# Patient Record
Sex: MALE | Race: WHITE | NOT HISPANIC OR LATINO | URBAN - METROPOLITAN AREA
[De-identification: names, ages, dates, MRNs, and addresses within clinical notes are randomized per-mention and may not be internally consistent; named-entity substitution may affect disease eponyms.]

---

## 2022-06-29 ENCOUNTER — HOSPITAL ENCOUNTER (OUTPATIENT)
Dept: CT IMAGING | Facility: HOSPITAL | Age: 67
Discharge: HOME | End: 2022-06-29
Payer: MEDICARE

## 2022-06-29 DIAGNOSIS — S06.6X0A TRAUMATIC SUBARACHNOID HEMORRHAGE WITHOUT LOSS OF CONSCIOUSNESS, INITIAL ENCOUNTER (HCC): ICD-10-CM

## 2022-06-29 PROCEDURE — G1004 CDSM NDSC: HCPCS

## 2022-06-29 PROCEDURE — 70450 CT HEAD/BRAIN W/O DYE: CPT

## 2022-07-28 ENCOUNTER — OFFICE VISIT (OUTPATIENT)
Dept: URGENT CARE | Facility: CLINIC | Age: 67
End: 2022-07-28
Payer: MEDICARE

## 2022-07-28 VITALS — RESPIRATION RATE: 18 BRPM | HEART RATE: 61 BPM | OXYGEN SATURATION: 99 % | TEMPERATURE: 97.7 F

## 2022-07-28 DIAGNOSIS — H61.21 IMPACTED CERUMEN OF RIGHT EAR: Primary | ICD-10-CM

## 2022-07-28 PROCEDURE — 69210 REMOVE IMPACTED EAR WAX UNI: CPT

## 2022-07-28 PROCEDURE — 99213 OFFICE O/P EST LOW 20 MIN: CPT

## 2022-07-28 PROCEDURE — G0463 HOSPITAL OUTPT CLINIC VISIT: HCPCS

## 2022-07-28 RX ORDER — RIVAROXABAN 10 MG/1
10 TABLET, FILM COATED ORAL EVERY MORNING
COMMUNITY
Start: 2022-07-08

## 2022-07-28 RX ORDER — OFLOXACIN 3 MG/ML
10 SOLUTION AURICULAR (OTIC) DAILY
Qty: 5 ML | Refills: 0 | Status: SHIPPED | OUTPATIENT
Start: 2022-07-28 | End: 2022-08-04

## 2022-07-28 NOTE — PROGRESS NOTES
[facility]        NAME: Maxine Dia is a 74 y o  male  : 1955    MRN: [id number]  DATE: 2022  TIME: 1:01 PM    Assessment and Plan   Impacted cerumen of right ear [H61 21]  1  Impacted cerumen of right ear  ofloxacin (FLOXIN) 0 3 % otic solution     Successful removal of large amounts of ear wax  Tolerated well  Ear canal red with small abrasion  Will cover with ofloxacin ear drops  Patient Instructions     Apply ear drops  Follow up with PCP in 3-5 days  Consider seeing either PCP or ENT every 6 months for ear flushing  Proceed to the ER with worsening symptoms  Chief Complaint     Chief Complaint   Patient presents with    Earache     Greater than a week  States feels a bump in ear  Denies difficulty hearing,  pain to R ear         History of Present Illness       The patient presents today with complaints of ear discomfort, and a bump x 1 week  He denies recent illness, fevers, chills, or decreased hearing  He has a history of ear wax build up  He has not tried anything OTC for his symptoms  Review of Systems   Review of Systems   Constitutional: Negative for chills, fatigue and fever  HENT: Positive for ear pain (r ear discomfort)  Negative for congestion, ear discharge, postnasal drip, rhinorrhea, sinus pressure, sinus pain, sneezing, sore throat and trouble swallowing  Eyes: Negative  Respiratory: Negative for cough and shortness of breath  Cardiovascular: Negative for chest pain and palpitations  Gastrointestinal: Negative for abdominal pain, diarrhea, nausea and vomiting  Genitourinary: Negative for difficulty urinating  Musculoskeletal: Negative for myalgias  Skin: Negative for rash  Allergic/Immunologic: Negative for environmental allergies  Neurological: Negative for dizziness and headaches  Psychiatric/Behavioral: Negative            Current Medications       Current Outpatient Medications:     ofloxacin (FLOXIN) 0 3 % otic solution, Administer 10 drops to the right ear daily for 7 days, Disp: 5 mL, Rfl: 0    Xarelto 10 MG tablet, Take 10 mg by mouth every morning, Disp: , Rfl:     Current Allergies     Allergies as of 07/28/2022    (No Known Allergies)            The following portions of the patient's history were reviewed and updated as appropriate: allergies, current medications, past family history, past medical history, past social history, past surgical history and problem list      History reviewed  No pertinent past medical history  History reviewed  No pertinent surgical history  History reviewed  No pertinent family history  Medications have been verified  Objective   Pulse 61   Temp 97 7 °F (36 5 °C)   Resp 18   SpO2 99%        Physical Exam     Physical Exam  Vitals and nursing note reviewed  Constitutional:       General: He is not in acute distress  Appearance: Normal appearance  He is not ill-appearing  HENT:      Head: Normocephalic and atraumatic  Right Ear: External ear normal  Decreased hearing noted  Tenderness present  No laceration, drainage or swelling  There is impacted cerumen  No mastoid tenderness  Tympanic membrane is not perforated, erythematous or bulging  Left Ear: Hearing, tympanic membrane, ear canal and external ear normal       Ears:      Comments: After cerumen was removed, able to visualize ear canal and TM  Ear canal noted to be red, inflamed with small abrasion related to large amount of wax removed  No active bleeding noted  TM was intact  Nose: Nose normal       Mouth/Throat:      Lips: Pink  Mouth: Mucous membranes are moist       Pharynx: Oropharynx is clear  Tonsils: No tonsillar exudate  Eyes:      General: Vision grossly intact  Extraocular Movements: Extraocular movements intact  Pupils: Pupils are equal, round, and reactive to light  Cardiovascular:      Rate and Rhythm: Normal rate and regular rhythm        Heart sounds: Normal heart sounds  No murmur heard  Pulmonary:      Effort: Pulmonary effort is normal       Breath sounds: Normal breath sounds  Abdominal:      General: Abdomen is flat  Bowel sounds are normal       Palpations: Abdomen is soft  Musculoskeletal:         General: Normal range of motion  Cervical back: Normal range of motion  Skin:     General: Skin is warm  Findings: No rash  Neurological:      Mental Status: He is alert and oriented to person, place, and time  Motor: Motor function is intact  Psychiatric:         Attention and Perception: Attention normal          Mood and Affect: Mood normal                Ear cerumen removal    Date/Time: 7/28/2022 12:55 PM  Performed by: SHELLEY Carty  Authorized by: SHELLEY Carty   Universal Protocol:  Procedure performed by: Stanton Glover, [facility])  Consent: Verbal consent obtained  Consent given by: patient  Patient identity confirmed: verbally with patient      Patient location:  Clinic  Procedure details:     Location:  R ear    Procedure type: irrigation with instrumentation      Instrumentation: curette and forceps      Approach:  External  Post-procedure details:     Complication:  None    Hearing quality:  Improved    Patient tolerance of procedure: Tolerated well, no immediate complications  Comments:      Debrox applied to R ear and had patient lay with affected side up for 10 minutes  This was repeated three times each with irrigation and removal of ear wax in between

## 2022-07-28 NOTE — PATIENT INSTRUCTIONS
Apply ear drops  Follow up with PCP in 3-5 days  Consider seeing either PCP or ENT every 6 months for ear flushing  Proceed to the ER with worsening symptoms  Earwax Blockage   AMBULATORY CARE:   Earwax  can build up in your ear canal and cause a blockage  Earwax blockage happens when your ear makes earwax faster than your body can remove it  Common symptoms include the following:   Trouble hearing    Earache    Ear fullness or a feeling that something is plugging up your ear    Itching or ringing in your ear    Dizziness    Seed immediate care if:   You feel dizzy  You have discharge or blood coming out of your ear  Your ear pain does not go away or gets worse  Call your doctor if:   You have a fever  You have trouble hearing or hear ringing noises  You have questions or concerns about your condition or care  Treatment for earwax blockage:   Medicines  placed in the ear canal can soften the earwax so it will come out  Flushing your ear canal  with warm water may flush out the earwax  Small medical tools  may be used to remove the earwax  How to prevent earwax blockage:  Do not stick anything into your ears to clean them  Use cotton swabs on the outside of your ear only  Ask your healthcare provider for more information on ways to prevent blockage  Follow up with your doctor as directed:  Write down your questions so you remember to ask them during your visits  © Copyright [employer] 2022 Information is for End User's use only and may not be sold, redistributed or otherwise used for commercial purposes  All illustrations and images included in CareNotes® are the copyrighted property of A D A M , Inc  or [facility] Derrick Anthony   The above information is an  only  It is not intended as medical advice for individual conditions or treatments   Talk to your doctor, nurse or pharmacist before following any medical regimen to see if it is safe and effective for you

## 2022-11-28 ENCOUNTER — APPOINTMENT (EMERGENCY)
Dept: RADIOLOGY | Facility: HOSPITAL | Age: 67
End: 2022-11-28

## 2022-11-28 ENCOUNTER — HOSPITAL ENCOUNTER (EMERGENCY)
Facility: HOSPITAL | Age: 67
Discharge: HOME | End: 2022-11-28
Attending: EMERGENCY MEDICINE

## 2022-11-28 ENCOUNTER — APPOINTMENT (EMERGENCY)
Dept: VASCULAR ULTRASOUND | Facility: HOSPITAL | Age: 67
End: 2022-11-28

## 2022-11-28 VITALS
HEIGHT: 70 IN | OXYGEN SATURATION: 98 % | BODY MASS INDEX: 26.34 KG/M2 | HEART RATE: 102 BPM | WEIGHT: 184 LBS | DIASTOLIC BLOOD PRESSURE: 86 MMHG | RESPIRATION RATE: 16 BRPM | TEMPERATURE: 98.1 F | SYSTOLIC BLOOD PRESSURE: 130 MMHG

## 2022-11-28 DIAGNOSIS — G56.21 CUBITAL TUNNEL SYNDROME ON RIGHT: ICD-10-CM

## 2022-11-28 DIAGNOSIS — M79.601 RIGHT ARM PAIN: Primary | ICD-10-CM

## 2022-11-28 RX ORDER — NAPROXEN 250 MG/1
500 TABLET ORAL ONCE
Status: CANCELLED | OUTPATIENT
Start: 2022-11-28 | End: 2022-11-28

## 2022-11-28 RX ORDER — NAPROXEN 500 MG/1
500 TABLET ORAL 2 TIMES DAILY WITH MEALS
Qty: 20 TABLET | Refills: 0 | Status: SHIPPED | OUTPATIENT
Start: 2022-11-28
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
## 2025-01-16 ENCOUNTER — APPOINTMENT (OUTPATIENT)
Dept: RADIOLOGY | Facility: CLINIC | Age: 70
End: 2025-01-16
Payer: MEDICARE

## 2025-01-16 ENCOUNTER — OFFICE VISIT (OUTPATIENT)
Dept: URGENT CARE | Facility: CLINIC | Age: 70
End: 2025-01-16
Payer: MEDICARE

## 2025-01-16 VITALS
OXYGEN SATURATION: 97 % | HEART RATE: 78 BPM | DIASTOLIC BLOOD PRESSURE: 93 MMHG | TEMPERATURE: 98 F | RESPIRATION RATE: 16 BRPM | SYSTOLIC BLOOD PRESSURE: 153 MMHG

## 2025-01-16 DIAGNOSIS — M53.3 SACROILIAC JOINT PAIN: Primary | ICD-10-CM

## 2025-01-16 DIAGNOSIS — M53.3 SACROILIAC JOINT PAIN: ICD-10-CM

## 2025-01-16 PROCEDURE — 73502 X-RAY EXAM HIP UNI 2-3 VIEWS: CPT

## 2025-01-16 PROCEDURE — 99213 OFFICE O/P EST LOW 20 MIN: CPT | Performed by: PHYSICIAN ASSISTANT

## 2025-01-16 PROCEDURE — 96372 THER/PROPH/DIAG INJ SC/IM: CPT | Performed by: PHYSICIAN ASSISTANT

## 2025-01-16 PROCEDURE — G0463 HOSPITAL OUTPT CLINIC VISIT: HCPCS | Performed by: PHYSICIAN ASSISTANT

## 2025-01-16 RX ORDER — IBUPROFEN 400 MG/1
TABLET, FILM COATED ORAL EVERY 6 HOURS PRN
COMMUNITY
End: 2025-01-16

## 2025-01-16 RX ORDER — KETOROLAC TROMETHAMINE 30 MG/ML
30 INJECTION, SOLUTION INTRAMUSCULAR; INTRAVENOUS ONCE
Status: COMPLETED | OUTPATIENT
Start: 2025-01-16 | End: 2025-01-16

## 2025-01-16 RX ORDER — PREDNISONE 10 MG/1
TABLET ORAL
Qty: 30 TABLET | Refills: 0 | Status: SHIPPED | OUTPATIENT
Start: 2025-01-16

## 2025-01-16 RX ORDER — LORATADINE 10 MG/1
10 TABLET ORAL
COMMUNITY

## 2025-01-16 RX ADMIN — KETOROLAC TROMETHAMINE 30 MG: 30 INJECTION, SOLUTION INTRAMUSCULAR; INTRAVENOUS at 11:19
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
## 2025-01-16 NOTE — PATIENT INSTRUCTIONS
Patient Education     Sacroiliac Joint Pain   About this topic   The spine ends in a set of 5 fused bones. They are called the sacrum. They join the pelvis at the sacroiliac joint. This is also called the SI joint. Strong bands of tissue called ligaments hold this joint together. Normally, there is very little movement at this joint. Its job is to absorb shock and take stress off of the spine. You can have SI joint pain if this joint is irritated.  What are the causes?   Sometimes, the exact cause of SI pain is unknown. It is not always known if the pain is in the joint or in the ligaments around the joint. The pain may be caused by wear and tear on the joint from arthritis. Pregnancy causes this joint to become looser. Sometimes, the pain may be caused by swelling from problems like gout or an injury. Infection or a fracture can also cause SI pain.   What can make this more likely to happen?   You are more likely to have this problem if you have had an injury to your spine, pelvis, or buttocks. Someone with a history of being pregnant a few times is more likely to have problems with her SI joint. Legs that are not the same length can cause pain as well. Some infections may cause problems with the SI joint.  What are the main signs?   Pain in the lower back or buttocks. It may also be felt in the hips or pelvis. Some people feel the pain in the groin or back of the legs. This pain is often worse with activity like climbing stairs or standing for a long time.  Numbness or tingling in the upper leg  Feeling of weakness in the leg  Stiffness  Feeling unstable when moving  How does the doctor diagnose this health problem?   The doctor will do an exam and feel around your lower back. Your doctor will have you bend and twist at the waist to see what makes the pain worse. Your doctor may have you move and push and pull on your legs to test your motion and strength. Your doctor will check if the muscles in the back or legs  are tight. Your doctor may check the feeling and reflexes in your legs.  The doctor may order:  X-ray  CT or MRI scan  Bone scan  Lab tests  Diagnostic injection ? injecting a drug into the joint to see if relief is given  How does the doctor treat this health problem?   Rest from activities that make the problem worse  Ice  Heat may be used later but not right away. Heat can make swelling worse.  Special belt worn low on the hips called an SI belt. It helps to hold the joint tightly together.  Electrical stimulation  Exercises  Chiropractic manipulation  Radiofrequency ablation ? A treatment where small nerves around the joint are burned so they are numb. This does not always work. It may only last for a few years.  Surgery may be needed if other treatment plans do not work.  Injections (cortisone)  Are there other health problems to treat?   If the problem is caused by an infection, inflammatory arthritis, or ankylosing spondylosis, these problems will need to be treated.  What drugs may be needed?   The doctor may order drugs to:  Help with pain and swelling  Fight an infection  The doctor may give you a shot to help with pain and swelling. Talk with your doctor about possible risks with this shot.   What problems could happen?   Long-term back pain  Weight gain, less muscle strength and flexibility, weaker bones  Arthritis  Need for surgery  Infection  What can be done to prevent this health problem?   Stay active and work out to keep your muscles strong and flexible. Warm up slowly and stretch before you exercise.  Use good posture.  Use proper ways to lift and bend:  Spread your feet apart so you have a good base of support. Then, bend with your knees when you  something from the ground.  When lifting and moving an object, keep your back straight. Keep the object as close to your body as possible. Do not twist. Instead, move your feet to the direction you are going.  Follow your doctor's orders about  weight lifting.  Last Reviewed Date   2020-10-12  Consumer Information Use and Disclaimer   This generalized information is a limited summary of diagnosis, treatment, and/or medication information. It is not meant to be comprehensive and should be used as a tool to help the user understand and/or assess potential diagnostic and treatment options. It does NOT include all information about conditions, treatments, medications, side effects, or risks that may apply to a specific patient. It is not intended to be medical advice or a substitute for the medical advice, diagnosis, or treatment of a health care provider based on the health care provider's examination and assessment of a patient’s specific and unique circumstances. Patients must speak with a health care provider for complete information about their health, medical questions, and treatment options, including any risks or benefits regarding use of medications. This information does not endorse any treatments or medications as safe, effective, or approved for treating a specific patient. [employer] and its affiliates disclaim any warranty or liability relating to this information or the use thereof. The use of this information is governed by the Terms of Use, available at https://www.woltersDarkWorksuwer.com/en/know/clinical-effectiveness-terms   Copyright   Copyright © 2024 [employer] and its affiliates and/or licensors. All rights reserved.

## 2025-01-27 ENCOUNTER — EVALUATION (OUTPATIENT)
Dept: PHYSICAL THERAPY | Facility: CLINIC | Age: 70
End: 2025-01-27
Payer: MEDICARE

## 2025-01-27 DIAGNOSIS — M53.3 SACROILIAC JOINT PAIN: Primary | ICD-10-CM

## 2025-01-27 PROCEDURE — 97112 NEUROMUSCULAR REEDUCATION: CPT | Performed by: PHYSICAL THERAPIST

## 2025-01-27 PROCEDURE — 97161 PT EVAL LOW COMPLEX 20 MIN: CPT | Performed by: PHYSICAL THERAPIST

## 2025-01-27 PROCEDURE — 97110 THERAPEUTIC EXERCISES: CPT | Performed by: PHYSICAL THERAPIST

## 2025-01-27 NOTE — PROGRESS NOTES
"PT Evaluation     Today's date: 25  Patient name: Teddy Cedillo  : 1955  MRN: [id number]  Referring provider: Williams Chapa PA-C  Dx:   Encounter Diagnosis     ICD-10-CM    1. Sacroiliac joint pain  M53.3                       Assessment  Impairments: abnormal gait, abnormal or restricted ROM, abnormal movement, activity intolerance, impaired physical strength, lacks appropriate home exercise program and pain with function  Functional limitations: Pulling sensation with sit to stand transfers.  Symptom irritability: low    Assessment details: Teddy Cedillo is a 69 y.o. male referred with primary diagnosis of Sacroiliac joint pain  (primary encounter diagnosis) .  Patient presents with the following functional limitations: Pulling sensation with sit to stand transfers.  Symptoms consistent with possible ITB syndrome.  Lumbar testing did not change symptoms.  Tenderness to palpation of the left ITB.  Patient was instructed in a HEP for flexibility and strength.  He would like to continue with an HEP only at this time.  Understanding of Dx/Px/POC: good     Prognosis: good    Goals  STG (1 visit)  1. Patient will demonstrate proficiency in an HEP    Plan    Planned therapy interventions: patient education and home exercise program    Plan of Care beginning date: 2025  Plan of Care expiration date: 2025  Treatment plan discussed with: patient    Subjective Evaluation    History of Present Illness  Mechanism of injury: Patient states he was playing [employer] Ball 3 months ago and felt a \"pull\" in his lower back on the right side.  About 3 weeks ago he was having pain in his right lateral hip.  Seen in [facility]gent [facility] 25 X-rays of lumbar spine showed, \"No acute osseous abnormality. Mild right hip osteoarthritis.\"  He was prescribed Prednisone and feels much better.  Has a little \"tightness\" in the right lateral hip.  Referred now to outpatient PT services.  Pain  Current pain ratin  At best pain " ratin  At worst pain ratin  Location: Right lateral hip  Quality: pulling  Relieving factors: medications    Social Support    Employment status: not working (Retired)  Exercise history: Plays [employer].. Does [employer].      Diagnostic Tests  X-ray: normal    FCE comments: Performs All ADL's without pain or limitations  Denies paresthesias or weakness into bilateral LE.    Denies bowel/bladder changes.      Objective     Concurrent Complaints  Negative for night pain, disturbed sleep, bladder dysfunction, bowel dysfunction, saddle (S4) numbness, history of cancer and history of trauma    Static Posture     Lumbar Spine   Flattened.     Active Range of Motion     Lumbar   Flexion:  with pain Restriction level: minimal  Extension:  Restriction level: moderate  Left rotation:  WFL  Right rotation:  with pain    Joint Play   Joints within functional limits: L1, L2, L3, L4 and L5   Mechanical Assessment    Cervical      Thoracic      Lumbar    Standing flexion: repeated movements   Pain intensity: worse  Pain level: increased  Standing extension: repeated movements  Pain location: no change  Lying extension: repeated movements  Pain location: no change    Strength/Myotome Testing     Lumbar   Left   Normal strength    Right   Normal strength    Ambulation   Weight-Bearing Status   Weight-Bearing Status (Left): full weight bearing   Weight-Bearing Status (Right): full weight-bearing    Assistive device used: none    Ambulation: Stairs   Ascend stairs: independent  Pattern: reciprocal  Railings: without rails  Descend stairs: independent  Pattern: reciprocal  Railings: without rails    Observational Gait   Gait: within functional limits        Precautions: h/o DVT  Access Code [email or web address]   POC expires Unit limit Auth  expiration date PT/OT + Visit Limit?   3/24/25 NA 26 1                 Visit/Unit Tracking  AUTH Status:  Date               Pending Used 1               Remaining                       Manuals 1/27                                                                Neuro Re-Ed             Pt education L-spine anatomy and pathology.  ITB syndrome                                                                                          Ther Ex             Piriformis stretch right Instructed            Hip ABD, EXT nstructed            Right ITB stretch standing Instructed            Right ITB stretch in SL Instructed                                                                Ther Activity                                       Gait Training                                       Modalities